# Patient Record
Sex: FEMALE | Race: WHITE | NOT HISPANIC OR LATINO | URBAN - METROPOLITAN AREA
[De-identification: names, ages, dates, MRNs, and addresses within clinical notes are randomized per-mention and may not be internally consistent; named-entity substitution may affect disease eponyms.]

---

## 2017-02-22 ENCOUNTER — OFFICE (OUTPATIENT)
Dept: URBAN - METROPOLITAN AREA CLINIC 11 | Facility: CLINIC | Age: 77
End: 2017-02-22
Payer: COMMERCIAL

## 2017-02-22 VITALS
HEIGHT: 66 IN | SYSTOLIC BLOOD PRESSURE: 138 MMHG | RESPIRATION RATE: 16 BRPM | HEART RATE: 49 BPM | WEIGHT: 144 LBS | DIASTOLIC BLOOD PRESSURE: 51 MMHG

## 2017-02-22 DIAGNOSIS — I25.10 ATHEROSCLEROTIC HEART DISEASE OF NATIVE CORONARY ARTERY WITH: ICD-10-CM

## 2017-02-22 DIAGNOSIS — I10 ESSENTIAL (PRIMARY) HYPERTENSION: ICD-10-CM

## 2017-02-22 DIAGNOSIS — R14.0 ABDOMINAL DISTENSION (GASEOUS): ICD-10-CM

## 2017-02-22 DIAGNOSIS — D64.9 ANEMIA, UNSPECIFIED: ICD-10-CM

## 2017-02-22 DIAGNOSIS — K31.84 GASTROPARESIS: ICD-10-CM

## 2017-02-22 DIAGNOSIS — R94.5 ABNORMAL RESULTS OF LIVER FUNCTION STUDIES: ICD-10-CM

## 2017-02-22 DIAGNOSIS — E11.9 TYPE 2 DIABETES MELLITUS WITHOUT COMPLICATIONS: ICD-10-CM

## 2017-02-22 DIAGNOSIS — R11.0 NAUSEA: ICD-10-CM

## 2017-02-22 PROCEDURE — G8427 DOCREV CUR MEDS BY ELIG CLIN: HCPCS | Performed by: INTERNAL MEDICINE

## 2017-02-22 PROCEDURE — 99213 OFFICE O/P EST LOW 20 MIN: CPT | Performed by: INTERNAL MEDICINE

## 2017-02-22 PROCEDURE — 85027 COMPLETE CBC AUTOMATED: CPT | Performed by: INTERNAL MEDICINE

## 2017-02-22 PROCEDURE — 36415 COLL VENOUS BLD VENIPUNCTURE: CPT | Performed by: INTERNAL MEDICINE

## 2017-02-22 NOTE — SERVICEHPINOTES
Ms. Jonas is a 76-year-old female here for follow up of 2nd opinion of persistent abdominal pain. Patient says that her symptoms started around August 2016, though she does admit that she has had intermittent problems in general since her open heart surgery in 2015. She states that she has had issues where she will have epigastric pain after eating anything. She does not know of any certain foods that is worse than others. It occurs every day with any meal. It usually starts after eating a few bites. The epigastric pain is sharp and does not radiate. It is associated with nausea. She does occasionally vomit, and states that she will vomit about once a week. She states that she has lost 30 pounds since August 2016. She has been seen by GI doctor in [place], [place] had a negative EGD and colonoscopy. He apparently had her scheduled for capsule endoscopy because of iron deficiency anemia, but this has not yet been done. She states that she has had an abdominal ultrasound. She does state that she has "borderline diabetes" as well as some kidney issues. She has had recent blood transfusions. When I initially saw the patient we checked blood work that was overall normal except for borderline anemia, mild elevation of creatinine, and elevation of CRP to 3.8. Her percent iron saturation was barely low. Ferritin was normal. Hepatitis evaluation was negative. Celiac labs are normal. Her alkaline phosphatase was the only enzyme elevated and it was only 162. I have since reviewed her records from [place], which are in the chart. She underwent CT scan since our visit which was normal. She also underwent gastric emptying study which was positive for gastroparesis. The patient now comes back in for follow-up. She appears be doing very well. She states that she can eat pretty much whenever she wants and has gained some weight since then. She denies any nausea, vomiting, abdominal pain, diarrhea, or constipation. She does have some bloating. She states that capsule endoscopy was performed in [place], but we never received these results. When I last saw the patient we recheck blood work which did reveal hematocrit around 35, normalization of her CRP, and improvement of her hepatic panel with all normal except for mild elevation of alkaline phosphatase at 137. She states that she has any interim had another EGD by her primary gastroenterologist the stomach biopsies revealing some reactive gastropathy negative for H. pylori. She no longer takes Reglan and states that she has not been faithful to a low residue diet.BR

## 2017-02-22 NOTE — SERVICENOTES
Overall the patient appears be doing very well from a symptomatic standpoint.  Despite not following a low residue diet her gastroparesis does not appear to be bothering her.  She is no longer on a promotility agent.  She does continue to take a PPI and antispasmodic as needed.  Because she is doing so well we will release her from our care.  She may continue to follow-up with her primary GI doctor in [place].  I will check some basic blood work today to ensure that she has not had a significant drop in her CBC, however she does have any persistent anemia I will defer any further evaluation of this to her primary care and primary GI doctors.

## 2017-02-24 LAB
ALKALINE PHOSPHATASE ISOENZ: ALKALINE PHOS TOTAL: 122 U/L — HIGH (ref 40–120)
ALKALINE PHOSPHATASE ISOENZ: BONE %: 49.4 % (ref 19.1–67.7)
ALKALINE PHOSPHATASE ISOENZ: BONE: 60.3 U/L (ref 7.6–81.2)
ALKALINE PHOSPHATASE ISOENZ: INTESTINE %: 0 % (ref 0–20.6)
ALKALINE PHOSPHATASE ISOENZ: INTESTINE: 0 U/L (ref 0–24.7)
ALKALINE PHOSPHATASE ISOENZ: LIVER %: 42.6 % (ref 27.8–76.3)
ALKALINE PHOSPHATASE ISOENZ: LIVER 1: 52 U/L (ref 10.9–91.6)
ALKALINE PHOSPHATASE ISOENZ: LIVER 2%: 8 % (ref 0–8)
ALKALINE PHOSPHATASE ISOENZ: LIVER 2: 9.8 U/L — HIGH (ref 0–9.6)
ALKALINE PHOSPHATASE ISOENZ: PLACENTAL %: 0 %
ALKALINE PHOSPHATASE ISOENZ: PLACENTAL: 0 U/L
HEPATIC FUNCTION PANEL A: ALBUMIN: 4 G/DL (ref 3.5–5.2)
HEPATIC FUNCTION PANEL A: ALKALINE PHOSPHATASE: 132 U/L — HIGH (ref 34–115)
HEPATIC FUNCTION PANEL A: DIRECT BILIRUBIN: <0.1 MG/DL
HEPATIC FUNCTION PANEL A: SGOT (AST): 18 U/L (ref 13–40)
HEPATIC FUNCTION PANEL A: SGPT (ALT): 11 U/L (ref 7–52)
HEPATIC FUNCTION PANEL A: TOTAL BILIRUBIN: 0.2 MG/DL — LOW (ref 0.3–1.2)
HEPATIC FUNCTION PANEL A: TOTAL PROTEIN: 6.8 G/DL (ref 6.4–8.3)

## 2018-05-05 ENCOUNTER — INPATIENT HOSPITAL (OUTPATIENT)
Dept: URBAN - NONMETROPOLITAN AREA HOSPITAL 35 | Facility: HOSPITAL | Age: 78
End: 2018-05-05
Payer: COMMERCIAL

## 2018-05-05 DIAGNOSIS — K55.9 VASCULAR DISORDER OF INTESTINE, UNSPECIFIED: ICD-10-CM

## 2018-05-05 DIAGNOSIS — D64.9 ANEMIA, UNSPECIFIED: ICD-10-CM

## 2018-05-05 PROCEDURE — 99233 SBSQ HOSP IP/OBS HIGH 50: CPT | Performed by: INTERNAL MEDICINE

## 2018-05-06 PROCEDURE — 99233 SBSQ HOSP IP/OBS HIGH 50: CPT | Performed by: INTERNAL MEDICINE

## 2023-07-17 ENCOUNTER — OFFICE (OUTPATIENT)
Dept: URBAN - NONMETROPOLITAN AREA CLINIC 1 | Facility: CLINIC | Age: 83
End: 2023-07-17
Payer: COMMERCIAL

## 2023-07-17 VITALS
HEART RATE: 62 BPM | DIASTOLIC BLOOD PRESSURE: 65 MMHG | HEIGHT: 66 IN | SYSTOLIC BLOOD PRESSURE: 98 MMHG | WEIGHT: 143 LBS

## 2023-07-17 DIAGNOSIS — K55.1 CHRONIC VASCULAR DISORDERS OF INTESTINE: ICD-10-CM

## 2023-07-17 PROCEDURE — 99213 OFFICE O/P EST LOW 20 MIN: CPT | Performed by: INTERNAL MEDICINE

## 2025-02-17 ENCOUNTER — OFFICE (OUTPATIENT)
Dept: URBAN - NONMETROPOLITAN AREA CLINIC 1 | Facility: CLINIC | Age: 85
End: 2025-02-17
Payer: COMMERCIAL

## 2025-02-17 VITALS
DIASTOLIC BLOOD PRESSURE: 78 MMHG | WEIGHT: 130 LBS | DIASTOLIC BLOOD PRESSURE: 474 MMHG | SYSTOLIC BLOOD PRESSURE: 175 MMHG | HEIGHT: 66 IN | SYSTOLIC BLOOD PRESSURE: 116 MMHG | HEART RATE: 54 BPM

## 2025-02-17 DIAGNOSIS — R63.4 ABNORMAL WEIGHT LOSS: ICD-10-CM

## 2025-02-17 DIAGNOSIS — D50.9 IRON DEFICIENCY ANEMIA, UNSPECIFIED: ICD-10-CM

## 2025-02-17 DIAGNOSIS — R10.819 ABDOMINAL TENDERNESS, UNSPECIFIED SITE: ICD-10-CM

## 2025-02-17 DIAGNOSIS — R10.84 GENERALIZED ABDOMINAL PAIN: ICD-10-CM

## 2025-02-17 DIAGNOSIS — K29.60 OTHER GASTRITIS WITHOUT BLEEDING: ICD-10-CM

## 2025-02-17 DIAGNOSIS — R63.0 ANOREXIA: ICD-10-CM

## 2025-02-17 DIAGNOSIS — R19.7 DIARRHEA, UNSPECIFIED: ICD-10-CM

## 2025-02-17 PROCEDURE — 99214 OFFICE O/P EST MOD 30 MIN: CPT | Performed by: NURSE PRACTITIONER

## 2025-04-11 ENCOUNTER — OFFICE (OUTPATIENT)
Dept: URBAN - NONMETROPOLITAN AREA CLINIC 1 | Facility: CLINIC | Age: 85
End: 2025-04-11
Payer: MEDICARE

## 2025-04-11 VITALS
HEART RATE: 82 BPM | SYSTOLIC BLOOD PRESSURE: 187 MMHG | WEIGHT: 135 LBS | SYSTOLIC BLOOD PRESSURE: 173 MMHG | HEIGHT: 66 IN | DIASTOLIC BLOOD PRESSURE: 66 MMHG | DIASTOLIC BLOOD PRESSURE: 72 MMHG

## 2025-04-11 DIAGNOSIS — R11.2 NAUSEA WITH VOMITING, UNSPECIFIED: ICD-10-CM

## 2025-04-11 DIAGNOSIS — R10.13 EPIGASTRIC PAIN: ICD-10-CM

## 2025-04-11 DIAGNOSIS — R63.0 ANOREXIA: ICD-10-CM

## 2025-04-11 DIAGNOSIS — I77.79 DISSECTION OF OTHER SPECIFIED ARTERY: ICD-10-CM

## 2025-04-11 PROCEDURE — 99214 OFFICE O/P EST MOD 30 MIN: CPT | Performed by: NURSE PRACTITIONER

## 2025-04-11 RX ORDER — MIRTAZAPINE 7.5 MG/1
7.5 TABLET ORAL
Qty: 30 | Refills: 3 | Status: ACTIVE
Start: 2025-04-11

## 2025-04-11 RX ORDER — NORTRIPTYLINE HYDROCHLORIDE 10 MG/1
CAPSULE ORAL
Qty: 0 | Refills: 0 | Status: COMPLETED
End: 2025-04-11

## 2025-05-29 ENCOUNTER — AMBULATORY SURGICAL CENTER (OUTPATIENT)
Dept: URBAN - NONMETROPOLITAN AREA SURGERY 1 | Facility: SURGERY | Age: 85
End: 2025-05-29
Payer: MEDICARE

## 2025-05-29 VITALS
RESPIRATION RATE: 17 BRPM | HEART RATE: 54 BPM | DIASTOLIC BLOOD PRESSURE: 58 MMHG | OXYGEN SATURATION: 99 % | SYSTOLIC BLOOD PRESSURE: 160 MMHG | DIASTOLIC BLOOD PRESSURE: 46 MMHG | SYSTOLIC BLOOD PRESSURE: 188 MMHG | OXYGEN SATURATION: 100 % | SYSTOLIC BLOOD PRESSURE: 121 MMHG | SYSTOLIC BLOOD PRESSURE: 189 MMHG | HEART RATE: 47 BPM | SYSTOLIC BLOOD PRESSURE: 160 MMHG | SYSTOLIC BLOOD PRESSURE: 160 MMHG | DIASTOLIC BLOOD PRESSURE: 53 MMHG | HEART RATE: 45 BPM | DIASTOLIC BLOOD PRESSURE: 59 MMHG | DIASTOLIC BLOOD PRESSURE: 45 MMHG | SYSTOLIC BLOOD PRESSURE: 120 MMHG | OXYGEN SATURATION: 98 % | OXYGEN SATURATION: 99 % | RESPIRATION RATE: 20 BRPM | TEMPERATURE: 98.3 F | SYSTOLIC BLOOD PRESSURE: 189 MMHG | HEIGHT: 66 IN | RESPIRATION RATE: 20 BRPM | SYSTOLIC BLOOD PRESSURE: 121 MMHG | DIASTOLIC BLOOD PRESSURE: 58 MMHG | HEART RATE: 50 BPM | DIASTOLIC BLOOD PRESSURE: 53 MMHG | HEART RATE: 45 BPM | HEART RATE: 54 BPM | HEART RATE: 47 BPM | HEART RATE: 44 BPM | SYSTOLIC BLOOD PRESSURE: 188 MMHG | HEART RATE: 54 BPM | HEART RATE: 50 BPM | TEMPERATURE: 98.5 F | HEART RATE: 52 BPM | OXYGEN SATURATION: 100 % | DIASTOLIC BLOOD PRESSURE: 59 MMHG | OXYGEN SATURATION: 100 % | TEMPERATURE: 98.3 F | HEART RATE: 44 BPM | RESPIRATION RATE: 17 BRPM | HEART RATE: 52 BPM | OXYGEN SATURATION: 99 % | DIASTOLIC BLOOD PRESSURE: 46 MMHG | HEART RATE: 47 BPM | SYSTOLIC BLOOD PRESSURE: 108 MMHG | WEIGHT: 130 LBS | TEMPERATURE: 98.3 F | TEMPERATURE: 98.5 F | SYSTOLIC BLOOD PRESSURE: 108 MMHG | OXYGEN SATURATION: 98 % | DIASTOLIC BLOOD PRESSURE: 45 MMHG | HEIGHT: 66 IN | DIASTOLIC BLOOD PRESSURE: 58 MMHG | RESPIRATION RATE: 13 BRPM | RESPIRATION RATE: 13 BRPM | HEART RATE: 52 BPM | DIASTOLIC BLOOD PRESSURE: 59 MMHG | WEIGHT: 130 LBS | DIASTOLIC BLOOD PRESSURE: 45 MMHG | SYSTOLIC BLOOD PRESSURE: 120 MMHG | RESPIRATION RATE: 13 BRPM | HEART RATE: 50 BPM | SYSTOLIC BLOOD PRESSURE: 121 MMHG | HEART RATE: 45 BPM | OXYGEN SATURATION: 98 % | SYSTOLIC BLOOD PRESSURE: 188 MMHG | HEIGHT: 66 IN | WEIGHT: 130 LBS | SYSTOLIC BLOOD PRESSURE: 189 MMHG | TEMPERATURE: 98.5 F | RESPIRATION RATE: 17 BRPM | RESPIRATION RATE: 20 BRPM | SYSTOLIC BLOOD PRESSURE: 120 MMHG | HEART RATE: 44 BPM | SYSTOLIC BLOOD PRESSURE: 108 MMHG | DIASTOLIC BLOOD PRESSURE: 46 MMHG | DIASTOLIC BLOOD PRESSURE: 53 MMHG

## 2025-05-29 DIAGNOSIS — R11.2 NAUSEA WITH VOMITING, UNSPECIFIED: ICD-10-CM

## 2025-05-29 DIAGNOSIS — R10.13 EPIGASTRIC PAIN: ICD-10-CM

## 2025-05-29 PROCEDURE — 43235 EGD DIAGNOSTIC BRUSH WASH: CPT | Performed by: INTERNAL MEDICINE

## 2025-05-29 RX ADMIN — PROPOFOL 130 MG: 10 INJECTION, EMULSION INTRAVENOUS at 08:00

## 2025-06-13 ENCOUNTER — OFFICE (OUTPATIENT)
Dept: URBAN - NONMETROPOLITAN AREA CLINIC 1 | Facility: CLINIC | Age: 85
End: 2025-06-13
Payer: MEDICARE

## 2025-06-13 VITALS
HEIGHT: 66 IN | WEIGHT: 137 LBS | DIASTOLIC BLOOD PRESSURE: 52 MMHG | HEART RATE: 49 BPM | SYSTOLIC BLOOD PRESSURE: 139 MMHG

## 2025-06-13 DIAGNOSIS — K55.9 VASCULAR DISORDER OF INTESTINE, UNSPECIFIED: ICD-10-CM

## 2025-06-13 DIAGNOSIS — R19.7 DIARRHEA, UNSPECIFIED: ICD-10-CM

## 2025-06-13 PROCEDURE — 99213 OFFICE O/P EST LOW 20 MIN: CPT | Performed by: INTERNAL MEDICINE

## 2025-06-13 RX ORDER — DICYCLOMINE HYDROCHLORIDE 10 MG/1
30 CAPSULE ORAL
Qty: 90 | Refills: 11 | Status: ACTIVE